# Patient Record
Sex: FEMALE | URBAN - METROPOLITAN AREA
[De-identification: names, ages, dates, MRNs, and addresses within clinical notes are randomized per-mention and may not be internally consistent; named-entity substitution may affect disease eponyms.]

---

## 2019-12-02 ENCOUNTER — PROCEDURE VISIT (OUTPATIENT)
Dept: NEUROLOGY | Facility: CLINIC | Age: 34
End: 2019-12-02
Payer: COMMERCIAL

## 2019-12-02 VITALS — HEIGHT: 64 IN | WEIGHT: 270 LBS | BODY MASS INDEX: 46.1 KG/M2

## 2019-12-02 DIAGNOSIS — G56.01 CARPAL TUNNEL SYNDROME OF RIGHT WRIST: Primary | ICD-10-CM

## 2019-12-02 DIAGNOSIS — S62.515A NONDISPLACED FRACTURE OF PROXIMAL PHALANX OF LEFT THUMB, INITIAL ENCOUNTER FOR CLOSED FRACTURE: ICD-10-CM

## 2019-12-02 PROCEDURE — 95886 MUSC TEST DONE W/N TEST COMP: CPT | Performed by: PHYSICAL MEDICINE & REHABILITATION

## 2019-12-02 PROCEDURE — 95911 NRV CNDJ TEST 9-10 STUDIES: CPT | Performed by: PHYSICAL MEDICINE & REHABILITATION

## 2019-12-02 RX ORDER — ESCITALOPRAM OXALATE 10 MG/1
10 TABLET ORAL DAILY
Refills: 0 | COMMUNITY
Start: 2019-08-25

## 2019-12-02 NOTE — PROGRESS NOTES
[address]  Electromyography Consultation      History of Present Illness:    Dear Dr. Julio César Bolton,  Thank you for the opportunity to see Akhil Eden for electrodiagnostic consultation today.  As you know the Genitourinary  Difficulty Urinating: denies  Bladder Incontinence: denies  Pelvic Pain: denies  Painful Urination: denies   Musculoskeletal  Joint Stiffness: denies  Painful Joints: admits  Tailbone Pain: denies  Swollen Joints: denies   Peripheral Vascula cm  ms mV m/s % ms mVms   R MEDIAN - APB      Wrist 8 Wrist - APB 3.15 9.6  100 4.50 24.5      Ref. Ref. 4.40 4.0          Elbow 21 Elbow - Wrist 6.55 9.8 61.8 102 4.45 23.0      Ref. Ref.    49.0      L MEDIAN - APB      Wrist 8 Wrist - APB 3.05 9.7  10 Findings: Extremities were warmed with hot packs for 15 minutes prior to testing. Sensory nerve conduction studies revealed a relatively prolonged right median latency compared to the left side.   Amplitudes are normal.  The bilateral ulnar sensory respons Diplomate [employer]

## 2019-12-11 ENCOUNTER — OFFICE VISIT (OUTPATIENT)
Dept: NEUROLOGY | Facility: CLINIC | Age: 34
End: 2019-12-11
Payer: COMMERCIAL

## 2019-12-11 VITALS
HEART RATE: 84 BPM | HEIGHT: 64 IN | WEIGHT: 270 LBS | BODY MASS INDEX: 46.1 KG/M2 | DIASTOLIC BLOOD PRESSURE: 70 MMHG | RESPIRATION RATE: 16 BRPM | SYSTOLIC BLOOD PRESSURE: 110 MMHG

## 2019-12-11 DIAGNOSIS — E66.01 MORBID OBESITY (HCC): ICD-10-CM

## 2019-12-11 DIAGNOSIS — G57.12 MERALGIA PARESTHETICA OF LEFT SIDE: Primary | ICD-10-CM

## 2019-12-11 PROCEDURE — 99214 OFFICE O/P EST MOD 30 MIN: CPT | Performed by: PHYSICAL MEDICINE & REHABILITATION

## 2019-12-11 NOTE — PROGRESS NOTES
[address]  Progress Note    CHIEF COMPLAINT:  Patient presents with:  Leg Pain: Patient presents for left thigh pain, numbness and burning sensation, has had on/off for many years, worsened over the past 6 denies  Abdominal Pain: denies  Blood in Stool : denies  Rectal Pain: denies   Hematology  Easy Bruising: denies  Easy Bleeding: denies   Genitourinary  Difficulty Urinating: denies  Bladder Incontinence: denies  Pelvic Pain: denies  Painful Urination: den meralgia paresthetica. This is not a spinal problem. She was relieved. Weight loss would be very helpful. We discussed options including physical therapy, neuropathic pain medicine and injections. She wished to pursue a course of watchful waiting.   Re

## 2019-12-14 PROBLEM — G57.12 MERALGIA PARESTHETICA OF LEFT SIDE: Status: ACTIVE | Noted: 2019-12-14

## 2019-12-14 PROBLEM — E66.01 MORBID OBESITY (HCC): Status: ACTIVE | Noted: 2019-12-14

## 2019-12-17 ENCOUNTER — MED REC SCAN ONLY (OUTPATIENT)
Dept: NEUROLOGY | Facility: CLINIC | Age: 34
End: 2019-12-17

## 2019-12-30 ENCOUNTER — TELEPHONE (OUTPATIENT)
Dept: NEUROLOGY | Facility: CLINIC | Age: 34
End: 2019-12-30

## (undated) NOTE — LETTER
[address]   Date:   12/2/2019     Name:   Anastasiia Arreaga    YOB: 1985   MRN:   [id number]       WHERE IS YOUR PAIN NOW? Vladimir the areas on your body where you feel the described sensations.   Use the appropriate sy

## (undated) NOTE — LETTER
[address]   Date:   12/11/2019     Name:   Sully Garcia    YOB: 1985   MRN:   [id number]       WHERE IS YOUR PAIN NOW? Vladimir the areas on your body where you feel the described sensations.   Use the appropriate s